# Patient Record
Sex: MALE | Race: WHITE | Employment: FULL TIME | URBAN - METROPOLITAN AREA
[De-identification: names, ages, dates, MRNs, and addresses within clinical notes are randomized per-mention and may not be internally consistent; named-entity substitution may affect disease eponyms.]

---

## 2018-07-20 PROBLEM — F32.A DEPRESSION: Status: ACTIVE | Noted: 2018-07-20

## 2018-07-21 ENCOUNTER — HOSPITAL ENCOUNTER (EMERGENCY)
Age: 33
Discharge: HOME | End: 2018-07-21
Attending: EMERGENCY MEDICINE
Payer: COMMERCIAL

## 2018-07-21 ENCOUNTER — HOSPITAL ENCOUNTER (INPATIENT)
Age: 33
LOS: 2 days | Discharge: HOME | DRG: 882 | End: 2018-07-23
Attending: PSYCHIATRY & NEUROLOGY | Admitting: PSYCHIATRY & NEUROLOGY
Payer: COMMERCIAL

## 2018-07-21 ENCOUNTER — APPOINTMENT (OUTPATIENT)
Dept: GENERAL RADIOLOGY | Age: 33
End: 2018-07-21
Payer: COMMERCIAL

## 2018-07-21 VITALS
WEIGHT: 200 LBS | SYSTOLIC BLOOD PRESSURE: 147 MMHG | TEMPERATURE: 97 F | OXYGEN SATURATION: 97 % | HEIGHT: 70 IN | HEART RATE: 84 BPM | BODY MASS INDEX: 28.63 KG/M2 | RESPIRATION RATE: 16 BRPM | DIASTOLIC BLOOD PRESSURE: 71 MMHG

## 2018-07-21 DIAGNOSIS — F32.A DEPRESSION, UNSPECIFIED DEPRESSION TYPE: ICD-10-CM

## 2018-07-21 DIAGNOSIS — S93.401A SPRAIN OF RIGHT ANKLE, UNSPECIFIED LIGAMENT, INITIAL ENCOUNTER: Primary | ICD-10-CM

## 2018-07-21 PROBLEM — F33.9 MAJOR DEPRESSION, RECURRENT (HCC): Status: ACTIVE | Noted: 2018-07-21

## 2018-07-21 PROCEDURE — 1240000000 HC EMOTIONAL WELLNESS R&B

## 2018-07-21 PROCEDURE — 73562 X-RAY EXAM OF KNEE 3: CPT

## 2018-07-21 PROCEDURE — 99284 EMERGENCY DEPT VISIT MOD MDM: CPT

## 2018-07-21 PROCEDURE — 73630 X-RAY EXAM OF FOOT: CPT

## 2018-07-21 PROCEDURE — 6370000000 HC RX 637 (ALT 250 FOR IP): Performed by: PSYCHIATRY & NEUROLOGY

## 2018-07-21 PROCEDURE — 72100 X-RAY EXAM L-S SPINE 2/3 VWS: CPT

## 2018-07-21 PROCEDURE — 73610 X-RAY EXAM OF ANKLE: CPT

## 2018-07-21 RX ORDER — HYDROXYZINE HYDROCHLORIDE 25 MG/1
25 TABLET, FILM COATED ORAL 3 TIMES DAILY PRN
Status: DISCONTINUED | OUTPATIENT
Start: 2018-07-21 | End: 2018-07-23 | Stop reason: HOSPADM

## 2018-07-21 RX ORDER — BENZTROPINE MESYLATE 1 MG/ML
2 INJECTION INTRAMUSCULAR; INTRAVENOUS DAILY PRN
Status: DISCONTINUED | OUTPATIENT
Start: 2018-07-21 | End: 2018-07-23 | Stop reason: HOSPADM

## 2018-07-21 RX ORDER — MAGNESIUM HYDROXIDE/ALUMINUM HYDROXICE/SIMETHICONE 120; 1200; 1200 MG/30ML; MG/30ML; MG/30ML
20 SUSPENSION ORAL 3 TIMES DAILY PRN
Status: DISCONTINUED | OUTPATIENT
Start: 2018-07-21 | End: 2018-07-23 | Stop reason: HOSPADM

## 2018-07-21 RX ORDER — NICOTINE 21 MG/24HR
1 PATCH, TRANSDERMAL 24 HOURS TRANSDERMAL DAILY
Status: DISCONTINUED | OUTPATIENT
Start: 2018-07-21 | End: 2018-07-23 | Stop reason: HOSPADM

## 2018-07-21 RX ORDER — TRAZODONE HYDROCHLORIDE 50 MG/1
50 TABLET ORAL NIGHTLY PRN
Status: DISCONTINUED | OUTPATIENT
Start: 2018-07-21 | End: 2018-07-23 | Stop reason: HOSPADM

## 2018-07-21 RX ORDER — ACETAMINOPHEN 325 MG/1
650 TABLET ORAL EVERY 4 HOURS PRN
Status: DISCONTINUED | OUTPATIENT
Start: 2018-07-21 | End: 2018-07-23 | Stop reason: HOSPADM

## 2018-07-21 ASSESSMENT — PAIN DESCRIPTION - FREQUENCY: FREQUENCY: INTERMITTENT

## 2018-07-21 ASSESSMENT — PAIN SCALES - GENERAL
PAINLEVEL_OUTOF10: 8
PAINLEVEL_OUTOF10: 5
PAINLEVEL_OUTOF10: 4

## 2018-07-21 ASSESSMENT — PAIN DESCRIPTION - LOCATION
LOCATION: ANKLE

## 2018-07-21 ASSESSMENT — PAIN DESCRIPTION - DESCRIPTORS: DESCRIPTORS: ACHING;SHOOTING

## 2018-07-21 ASSESSMENT — SLEEP AND FATIGUE QUESTIONNAIRES
AVERAGE NUMBER OF SLEEP HOURS: 8
DO YOU HAVE DIFFICULTY SLEEPING: COMMENT
DO YOU USE A SLEEP AID: NO
DO YOU HAVE DIFFICULTY SLEEPING: NO
DO YOU USE A SLEEP AID: COMMENT

## 2018-07-21 ASSESSMENT — PAIN DESCRIPTION - PAIN TYPE
TYPE: ACUTE PAIN
TYPE: ACUTE PAIN

## 2018-07-21 ASSESSMENT — PAIN DESCRIPTION - ORIENTATION
ORIENTATION: RIGHT

## 2018-07-21 ASSESSMENT — LIFESTYLE VARIABLES
HISTORY_ALCOHOL_USE: NO
HISTORY_ALCOHOL_USE: NO

## 2018-07-21 ASSESSMENT — PATIENT HEALTH QUESTIONNAIRE - PHQ9: SUM OF ALL RESPONSES TO PHQ QUESTIONS 1-9: 2

## 2018-07-21 NOTE — CARE COORDINATION
BHI Biopsychosocial Assessment    Current Level of Psychosocial Functioning     Independent  X  Dependent    Minimal Assist     Psychosocial High Risk Factors (check all that apply)    Unable to obtain meds   Chronic illness/pain    Substance abuse   Lack of Family Support   Financial stress   Isolation   Inadequate Community Resources  Suicide attempt(s)  Not taking medications   Victim of crime   Developmental Delay  Unable to manage personal needs    Age 70 or older   Homeless  No transportation   Readmission within 30 days  Unemployment  Traumatic Event - recent break up with mother of his 15and 11year old - were together 12 years       Psychiatric Advanced Directives: none     Family to [employer] in Treatment: parents    Sexual Orientation:  heterosexual    Patient Strengths: employed, stable housing, medical insurance, support system, linked with [employer]    Patient Barriers: lack of insight, not able to see children, [address] children svs involved     Opiate/AOD Education Provided:  No hx tox and alcohol screen were negative from referring hospital     CMHC/mental health history: no hx or prior hx     Plan of Care   medication management, group/individual therapies, family meetings, psycho -education, treatment team meetings to assist with stabilization    Initial Discharge Plan:  Refer to [facility] for services and taxi home to [place] address      Clinical Summary:  Patient is a single 26year old  male pink slipped to [facility] for reported SI and depression. Patient reportedly jumped out of a 2nd floor window, crisis access viewed this as suicidal attempt and pinked patient.   Patient reports jumped out of window due to angry at not being able to see his children for over 2 weeks due to [address] involved over DV charges between patient and the mother of his children and [facility] has not been calling back or providing information to his  and he feels he is being judged

## 2018-07-21 NOTE — ED PROVIDER NOTES
Conjunctivae are normal.   Neck: Normal range of motion. Neck supple. Cardiovascular: Normal rate, regular rhythm and normal heart sounds. No murmur heard. Pulmonary/Chest: Effort normal and breath sounds normal.   Abdominal: Soft. There is no tenderness. Musculoskeletal:   No back tenderness. Right ankle, mild edema. Tender. NV intact distal, normal cap refill. No knee tenderness or deformity. No hip tenderness or deformity. Left leg grossly normal.  Diffuse exam otherwise negative. Neurological: He is alert and oriented to person, place, and time. Skin: Skin is warm and dry. No rash noted. He is not diaphoretic. Psychiatric: He exhibits a depressed mood. He expresses suicidal ideation. He expresses suicidal plans. Nursing note and vitals reviewed. MEDICAL DECISION MAKING:   [facility]  Number of Diagnoses or Management Options  Depression, unspecified depression type:   Sprain of right ankle, unspecified ligament, initial encounter:   Diagnosis management comments: Reviewed results. No evidence of fracture. Clinically consistent with ankle sprain. Placed in air cast.  NV intact post placement. Discussed with psych, pt is direct admit to [facility]. Will discharge directly to [facility].  Ready for admission at this time. CRITICAL CARE:     PROCEDURES:    Procedures    DIAGNOSTIC RESULTS   EKG: All EKG's are interpreted by the Emergency Department Physician who either signs or Co-signs this chart in the absence of a cardiologist.      RADIOLOGY:All plain film, CT, MRI, and formal ultrasound images (except ED bedside ultrasound) are read by the radiologist, see reports below, unless otherwise noted in MDM or here. XR LUMBAR SPINE (2-3 VIEWS)   Final Result   1. No acute findings of the lumbar spine. XR ANKLE RIGHT (MIN 3 VIEWS)   Final Result   No radiographic evidence of fracture or dislocation identified.          XR FOOT RIGHT (MIN 3 VIEWS)   Final Result   No radiographic evidence of

## 2018-07-22 PROCEDURE — 1240000000 HC EMOTIONAL WELLNESS R&B

## 2018-07-23 VITALS
HEIGHT: 70 IN | BODY MASS INDEX: 28.63 KG/M2 | SYSTOLIC BLOOD PRESSURE: 146 MMHG | RESPIRATION RATE: 14 BRPM | TEMPERATURE: 98.4 F | WEIGHT: 200 LBS | DIASTOLIC BLOOD PRESSURE: 91 MMHG | HEART RATE: 69 BPM

## 2018-07-23 NOTE — BH NOTE
Date: 7/23/18              Start Time: 1600                    End Time: 1630     Number Participants in Group:  12/20     Goal:  Patient will demonstrate increased interpersonal interaction   Topic: Social support and coping skills     Discipline Responsible:    OT   AT   [facility] x RT P Other         Participation Level:                   None   Minimal     Active Listener x Interactive     Monopolizing             Participation Quality:  x Appropriate   Inappropriate   x       Attentive         Intrusive   x       Sharing         Resistant           Supportive         Lethargic         Affective:          x Congruent   Incongruent   Blunted   Flat     Constricted   Anxious   Elated   Angry     Labile   Depressed   Other             Cognitive:  x Alert x Oriented PPTP      Concentration x G   F   P   Attention Span x G   F   P   Short-Term Memory x G   F   P   Long-Term Memory x G   F   P   ProblemSolving/  Decision Making x G   F   P   Ability to Process  Information x G   F   P        Contributing Factors              Delusional              Hallucinating              Flight of Ideas              Other:         Modes of Intervention:  x Education x Support x Exploration     Clarifying   Problem Solving   Confrontation   x Socialization x Limit Setting x Reality Testing   x Activity   Movement   Media     Other:                  Response to Learning:  x Able to verbalize current knowledge/experience   x Able to verbalize/acknowledge new learning   x Able to retain information   x Capable of insight   x Able to change behavior   x Progressing to goal     Other:          Comments: Pt attended group.
RN has reviewed LPN charting for this shift.
( X) Patient refused counseling  ( ) Patient has not smoked in the last 30 days    Metabolic Screening:    No results found for: LABA1C    No results for input(s): CHOL, TRIG, HDL, LDLCALC, LABVLDL in the last 72 hours. Body mass index is 28.7 kg/m². BP Readings from Last 2 Encounters:   07/21/18 133/76   07/21/18 (!) 147/71           Pt admitted with followings belongings:  Dentures: None  Vision - Corrective Lenses: None  Hearing Aid: None  Jewelry: None  Body Piercings Removed: N/A  Clothing: Footwear, Pants, Shirt, Socks, Undergarments (Comment) ($149.60)  Were All Patient Medications Collected?: Not Applicable  Other Valuables: Money (Comment), Claudette Gomez     . Valuables placed in safe in security envelope, number:  [id number]. Patient oriented to surroundings and program expectations and copy of patient rights given. Received admission packet:  Yes. Consents reviewed, signed No. Refused Yes. Patient verbalize understanding:  Yes. Patient education on precautions: Yes    Patient was Pinked from rescue crisis. Patient was having suicidal thoughts and attempted to hang self. Patient denies any homicidal thoughts. Patient denies any audio or visual hallucinations. Patient was oriented to the unit. Patient was wanded for safety. Patient was changed into paper pants hospital gown and socks. Patient denies any additional needs at this time. 15 minute safety checks were initiated.                  Dhiraj Horner RN

## 2018-07-23 NOTE — PLAN OF CARE
Problem: Altered Mood, Depressive Behavior:  Goal: Able to verbalize acceptance of life and situations over which he or she has no control  Able to verbalize acceptance of life and situations over which he or she has no control   Outcome: Ongoing  [address] [place] [address]  Initial Interdisciplinary Treatment Plan NO      Original treatment plan Date & Time: 7/21/2018 1002    Admission Type:  Admission Type: Involuntary    Reason for admission:   Reason for Admission: SI to hang self due to conflict with girlfriend     Estimated Length of Stay:  5-7days  Estimated Discharge Date: to be determined by physician    PATIENT STRENGTHS:  Patient Strengths:Strengths:  (STEFANIE)  Patient Strengths and Limitations:Limitations: Difficult relationships / poor social skills  Addictive Behavior: Addictive Behavior  In the past 3 months, have you felt or has someone told you that you have a problem with:  : None  Do you have a history of Chemical Use?: No  Do you have a history of Alcohol Use?: No  Do you have a history of Street Drug Abuse?: No  Histroy of Prescripton Drug Abuse?: No  Medical Problems:No past medical history on file.   Status EXAM:Status and Exam  Normal: No  Facial Expression: Flat, Avoids Gaze  Affect: Blunt  Level of Consciousness: Alert  Mood:Normal: No  Mood: Depressed, Anxious, Irritable  Motor Activity:Normal: No  Motor Activity: Decreased  Interview Behavior: Irritable  Preception: Olla to Person, Olla to Time, Olla to Place, Olla to Situation  Attention:Normal: Yes  Attention: Distractible  Thought Processes: Blocking  Thought Content:Normal: Yes  Hallucinations: None  Delusions: No  Memory:Normal: Yes  Insight and Judgment: No  Insight and Judgment: Poor Judgment, Poor Insight  Present Suicidal Ideation: Yes  Present Homicidal Ideation: No    EDUCATION:   Learner Progress Toward Treatment Goals: reviewed group plans and strategies for care    Method:group therapy, medication compliance,
Problem: Altered Mood, Depressive Behavior:  Goal: Able to verbalize acceptance of life and situations over which he or she has no control  Able to verbalize acceptance of life and situations over which he or she has no control   Outcome: Ongoing  Inessa Kick has been pleasant this with staff this morning. He did attend groups. He states he's ready for discharge so that he can return to work. He believes his being here \"was just an overreaction from everyone\" an that he simply had a argument with his girlfriend.
Problem: Altered Mood, Depressive Behavior:  Goal: Able to verbalize acceptance of life and situations over which he or she has no control  Able to verbalize acceptance of life and situations over which he or she has no control   Outcome: Ongoing  PSYCHOEDUCATION GROUP NOTE    Date:   7/21/2018 Start Time: 1400  End Time: 1440    Number Participants in Group:  13/21  **NOTE: Group started late d/t dietary arriving on unit at 1330.   Goal:  Patient will demonstrate increased: interpersonal interaction, cognition, and knowledge of coping skills/ crisis prevention  Topic: Socialization, Cognition, Coping, and Crisis Prevention skills group  Intervention Used: Mental Health Scattergories     Discipline Responsible:   OT  AT  [facility]. X RT MHP Other       Participation Level:     None  Minimal   X Active Listener X Interactive    Monopolizing         Participation Quality:  X Appropriate  Inappropriate   X       Attentive        Intrusive   X       Sharing        Resistant   X       Supportive        Lethargic       Affective:   x Congruent  Incongruent  Blunted  Flat    Constricted  Anxious  Elated  Angry    Labile  Depressed  Other  bright       Cognitive:  X Alert X Oriented PPTP     Concentration X G  F  P   Attention Span X G  F  P   Short-Term Memory X G  F  P   Long-Term Memory X G  F  P   ProblemSolving/  Decision Making X G  F  P   Ability to Process  Information X G  F  P      Contributing Factors             Delusional             Hallucinating             Flight of Ideas             Other:       Modes of Intervention:  x Education  Support x Exploration   x Clarifying  Problem Solving  Confrontation   x Socialization  Limit Setting  Reality Testing   x Activity  Movement  Media    Other:            Response to Learning:  X Able to verbalize current knowledge/experience   X Able to verbalize/acknowledge new learning   X Able to retain information   X Capable of insight    Able to change behavior   X Progressing
Problem: Altered Mood, Depressive Behavior:  Goal: Able to verbalize acceptance of life and situations over which he or she has no control  Able to verbalize acceptance of life and situations over which he or she has no control   Outcome: Ongoing  Patient  verbalize his life stressors and is aware he need to make some changes. denies any depression. 15 MIN safety checks maintained.
Problem: Altered Mood, Depressive Behavior:  Goal: Able to verbalize and/or display a decrease in depressive symptoms  Able to verbalize and/or display a decrease in depressive symptoms   Outcome: Ongoing  PSYCHOEDUCATION GROUP NOTE    Date: 7/22/2018  Start Time: 0900  End Time: 0925    Number Participants in Group:  15/21    Goal:  Patient will demonstrate increased interpersonal interaction   Topic: Community Meeting/Goal Setting    Discipline Responsible:   OT  AT    Ns. x RT MHP Other       Participation Level:     None x Minimal   x Active Listener  Interactive    Monopolizing         Participation Quality:  x Appropriate  Inappropriate   x       Attentive        Intrusive   x       Sharing        Resistant          Supportive        Lethargic       Affective:    Congruent  Incongruent  Blunted  Flat   x Constricted  Anxious  Elated  Angry    Labile  Depressed  Other         Cognitive:  x Alert x Oriented PPTP     Concentration  G x F  P   Attention Span  G x F  P   Short-Term Memory  G x F  P   Long-Term Memory  G x F  P   ProblemSolving/  Decision Making  G x F  P   Ability to Process  Information  G x F  P      Contributing Factors             Delusional             Hallucinating             Flight of Ideas             Other:       Modes of Intervention:  x Education x Support x Exploration   x Clarifying x Problem Solving x Confrontation   x Socialization x Limit Setting x Reality Testing    Activity  Movement  Media    Other:            Response to Learning:  x Able to verbalize current knowledge/experience   x Able to verbalize/acknowledge new learning   x Able to retain information    Capable of insight    Able to change behavior   x Progressing to goal    Other:        Comments:
Problem: Altered Mood, Depressive Behavior:  Goal: Able to verbalize and/or display a decrease in depressive symptoms  Able to verbalize and/or display a decrease in depressive symptoms   Outcome: Ongoing  PSYCHOEDUCATION GROUP NOTE    Date: 7/23/18  Start Time: 1335  End Time: 1425    Number Participants in Group:  16    Goal:  Patient will demonstrate increased interpersonal interaction   Topic: Social support and coping skills    Discipline Responsible:   OT  AT  [facility]. x RT MHP Other       Participation Level:     None  Minimal    Active Listener x Interactive    Monopolizing         Participation Quality:  x Appropriate  Inappropriate   x       Attentive        Intrusive   x       Sharing        Resistant          Supportive        Lethargic       Affective:   x Congruent  Incongruent  Blunted  Flat    Constricted  Anxious  Elated  Angry    Labile  Depressed  Other         Cognitive:  x Alert x Oriented PPTP     Concentration x G  F  P   Attention Span x G  F  P   Short-Term Memory x G  F  P   Long-Term Memory x G  F  P   ProblemSolving/  Decision Making x G  F  P   Ability to Process  Information x G  F  P      Contributing Factors             Delusional             Hallucinating             Flight of Ideas             Other:       Modes of Intervention:  x Education x Support x Exploration    Clarifying  Problem Solving  Confrontation   x Socialization x Limit Setting x Reality Testing   x Activity  Movement  Media    Other:            Response to Learning:  x Able to verbalize current knowledge/experience   x Able to verbalize/acknowledge new learning   x Able to retain information   x Capable of insight   x Able to change behavior   x Progressing to goal    Other:        Comments: Pt attended group and participated.
Judgment, Poor Insight  Present Suicidal Ideation: No (Pt. denies.)  Present Homicidal Ideation: No (Pt. denies.)    Daily Assessment Last Entry:   Daily Sleep (WDL): Within Defined Limits         Patient Currently in Pain: Denies  Daily Nutrition (WDL): Within Defined Limits    Patient Monitoring:  Frequency of Checks: 4 times per hour, close    Psychiatric Symptoms:   Depression Symptoms  Depression Symptoms: Increased irritability, Isolative, Loss of interest  Anxiety Symptoms  Anxiety Symptoms: Generalized  Kiera Symptoms  Kiera Symptoms: No problems reported or observed. Psychosis Symptoms  Delusion Type: No problems reported or observed. Suicide Risk CSSR-S:  1) Within the past month, have you wished you were dead or wished you could go to sleep and not wake up? : YES  2) Within the past month, have you actually had any thoughts of killing yourself? : NO  3) Within the past month, have you been thinking about how you might kill yourself? : NO  5) Within the past month, have you started to work out or worked out the details of how to kill yourself? Do you intend to carry out this plan? : NO  6)  Have you ever done anything, started to do anything, or prepared to do anything to end your life?: NO  Change in Result NO Change in Plan of care NO      EDUCATION:   EDUCATION:   Learner Progress Toward Treatment Goals: Reviewed results and recommendations of this team, Reviewed group plan and strategies, Reviewed signs, symptoms and risk of self harm and violent behavior, Reviewed goals and plan of care    Method:small group, individual verbal education    Outcome:verbalized by patient, but needs reinforcement to obtain goals    PATIENT GOALS:  Short term: Pt refused to attend and participate in treatment team review. Pt did not develop a short term goal.   Long term: Pt refused to attend and participate in treatment team review.  Pt did not develop a long term goal.     PLAN/TREATMENT RECOMMENDATIONS UPDATE:

## 2018-07-23 NOTE — CARE COORDINATION
Bridge Appointment completed: Reviewed Discharge Instructions with patient. Patient verbalizes understanding and agreement with the discharge plan using the teachback method.        Discharge Arrangements: 07/24/2018 at 11:30a with ALEENA; 08/03/2018 at 11:00a with Daquan Putnam counselor     Guardian notified: n/a  Discharge destination/address: [address], [place]   Transported by:  cab

## 2018-08-21 NOTE — DISCHARGE SUMMARY
Presenting Evaluation:  Charmaine Parnell is a 26 y.o. male who was admitted   On a pink slip from [facility]. Patient reported the was angry about not being able to see his children, made statements concerning for suicidality. Patient adamantly denies to me that he had any intent to harm himself. He denies feeling that he has biologic depression. He states he has had a difficult time adjusting to situation, for which [facility] is preventing him from seeing his children. He denies feeling that he could benefit from medications. Denied symptoms consistent with episodes of derrick/hypomania. Denied hallucinations or paranoia. Affect is euthymic and appropriately reactive. Thought process seems linear and well organized. He denies any history of mental health treatment or self-injurious behavior    Diagnostic Impression     Adjustment disorder      After discussion with patient about potential risks, benefits, side effects, decided to not start any medications. He was monitored for safety purposes with no indication patient posed any risk of harm to himself or anyone else. He was doing fairly well at the time of discharge and was not in any distress. Patient had been making rational and realistic plans so he was discharged. Patient had been bright, reactive, and interacting appropriately with staff and peers. There had been multiple consecutive days without any  safety concerns. He will follow up at [facility]. Pharmacy Instructions:      No medication prescribed. Medication List      You have not been prescribed any medications.          Disposition: Home

## 2018-09-05 PROBLEM — F43.23 ADJUSTMENT DISORDER WITH MIXED ANXIETY AND DEPRESSED MOOD: Status: ACTIVE | Noted: 2018-09-05
